# Patient Record
Sex: FEMALE | Race: WHITE | NOT HISPANIC OR LATINO | Employment: FULL TIME | URBAN - METROPOLITAN AREA
[De-identification: names, ages, dates, MRNs, and addresses within clinical notes are randomized per-mention and may not be internally consistent; named-entity substitution may affect disease eponyms.]

---

## 2017-02-18 ENCOUNTER — HOSPITAL ENCOUNTER (OUTPATIENT)
Facility: MEDICAL CENTER | Age: 22
End: 2017-02-18
Attending: PHYSICIAN ASSISTANT
Payer: COMMERCIAL

## 2017-02-18 ENCOUNTER — OFFICE VISIT (OUTPATIENT)
Dept: URGENT CARE | Facility: CLINIC | Age: 22
End: 2017-02-18
Payer: COMMERCIAL

## 2017-02-18 VITALS
TEMPERATURE: 98.4 F | OXYGEN SATURATION: 96 % | HEART RATE: 102 BPM | RESPIRATION RATE: 16 BRPM | DIASTOLIC BLOOD PRESSURE: 70 MMHG | WEIGHT: 124 LBS | SYSTOLIC BLOOD PRESSURE: 110 MMHG

## 2017-02-18 DIAGNOSIS — N39.0 URINARY TRACT INFECTION, SITE UNSPECIFIED: ICD-10-CM

## 2017-02-18 LAB
APPEARANCE UR: NORMAL
BILIRUB UR STRIP-MCNC: NORMAL MG/DL
COLOR UR AUTO: NORMAL
GLUCOSE UR STRIP.AUTO-MCNC: NORMAL MG/DL
KETONES UR STRIP.AUTO-MCNC: 160 MG/DL
LEUKOCYTE ESTERASE UR QL STRIP.AUTO: NORMAL
NITRITE UR QL STRIP.AUTO: NORMAL
PH UR STRIP.AUTO: 5 [PH] (ref 5–8)
PROT UR QL STRIP: 100 MG/DL
RBC UR QL AUTO: NORMAL
SP GR UR STRIP.AUTO: 1.02
UROBILINOGEN UR STRIP-MCNC: NORMAL MG/DL

## 2017-02-18 PROCEDURE — 99202 OFFICE O/P NEW SF 15 MIN: CPT | Performed by: PHYSICIAN ASSISTANT

## 2017-02-18 PROCEDURE — 87186 SC STD MICRODIL/AGAR DIL: CPT

## 2017-02-18 PROCEDURE — 99000 SPECIMEN HANDLING OFFICE-LAB: CPT | Performed by: PHYSICIAN ASSISTANT

## 2017-02-18 PROCEDURE — 87086 URINE CULTURE/COLONY COUNT: CPT

## 2017-02-18 PROCEDURE — 87077 CULTURE AEROBIC IDENTIFY: CPT

## 2017-02-18 PROCEDURE — 81002 URINALYSIS NONAUTO W/O SCOPE: CPT | Performed by: PHYSICIAN ASSISTANT

## 2017-02-18 RX ORDER — CEFUROXIME AXETIL 500 MG/1
500 TABLET ORAL 2 TIMES DAILY
Qty: 10 TAB | Refills: 0 | Status: SHIPPED | OUTPATIENT
Start: 2017-02-18 | End: 2017-02-23

## 2017-02-18 RX ORDER — NORGESTIMATE AND ETHINYL ESTRADIOL 0.25-0.035
1 KIT ORAL DAILY
COMMUNITY

## 2017-02-18 ASSESSMENT — ENCOUNTER SYMPTOMS
FEVER: 0
MUSCULOSKELETAL NEGATIVE: 1
FLANK PAIN: 0
ABDOMINAL PAIN: 1
CONSTITUTIONAL NEGATIVE: 1

## 2017-02-18 NOTE — MR AVS SNAPSHOT
Summer Garcia   2017 12:00 PM   Office Visit   MRN: [id number]    Department:  [facility]   Dept Phone:  [phone]    Description:  Female : 1995   Provider:  Carlos Alberto Benavidez PA-C           Reason for Visit     Urinary Frequency x 3 days, urinary frequency, burning w/ urination and lower back pain      Allergies as of 2017     No Known Allergies      You were diagnosed with     Urinary tract infection, site unspecified   [[id number]]         Vital Signs     Blood Pressure Pulse Temperature Respirations Weight Oxygen Saturation    110/70 mmHg 102 36.9 °C (98.4 °F) 16 56.246 kg (124 lb) 96%      Basic Information     Date Of Birth Sex Race Ethnicity Preferred Language    1995 Female White Non- English      Health Maintenance     Patient has no pending health maintenance at this time      Results     POCT Urinalysis      Component Value Standard Range & Units    POC Color callie Negative    POC Appearance cloudy Negative    POC Leukocyte Esterase 2+ Negative    POC Nitrites pos Negative    POC Urobiligen neg Negative (0.2) mg/dL    POC Protein 100 Negative mg/dL    POC Urine PH 5.0 5.0 - 8.0    POC Blood 3+ Negative    POC Specific Gravity 1.020 <1.005 - >1.030    POC Ketones 160 Negative mg/dL    POC Biliruben neg Negative mg/dL    POC Glucose neg Negative mg/dL                        Current Immunizations     No immunizations on file.      Below and/or attached are the medications your provider expects you to take. Review all of your home medications and newly ordered medications with your provider and/or pharmacist. Follow medication instructions as directed by your provider and/or pharmacist. Please keep your medication list with you and share with your provider. Update the information when medications are discontinued, doses are changed, or new medications (including over-the-counter products) are added; and carry medication information at all times in the event  of emergency situations     Allergies:  No Known Allergies          Medications  Valid as of: February 18, 2017 - 12:53 PM    Generic Name Brand Name Tablet Size Instructions for use    Cefuroxime Axetil (Tab) CEFTIN 500 MG Take 1 Tab by mouth 2 times a day for 5 days.        Norgestimate-Eth Estradiol (Tab) ORTHO-CYCLEN 0.25-35 MG-MCG Take 1 Tab by mouth every day.        .                 Medicines prescribed today were sent to:     [facility], [place] - [address] [facility]    [address] [place] [address]    Phone: [phone] Fax: [phone]    Open 24 Hours?: No      Medication refill instructions:       If your prescription bottle indicates you have medication refills left, it is not necessary to call your provider’s office. Please contact your pharmacy and they will refill your medication.    If your prescription bottle indicates you do not have any refills left, you may request refills at any time through one of the following ways: The online CinnaBid system (except Urgent Care), by calling your provider’s office, or by asking your pharmacy to contact your provider’s office with a refill request. Medication refills are processed only during regular business hours and may not be available until the next business day. Your provider may request additional information or to have a follow-up visit with you prior to refilling your medication.   *Please Note: Medication refills are assigned a new Rx number when refilled electronically. Your pharmacy may indicate that no refills were authorized even though a new prescription for the same medication is available at the pharmacy. Please request the medicine by name with the pharmacy before contacting your provider for a refill.        Your To Do List     Future Labs/Procedures Complete By Expires    URINE CULTURE(NEW)  As directed 8/21/2017         CinnaBid Access Code: [email or web address]  Expires: 3/20/2017  12:53 PM    Your email address is not on file at [facility].  Email Addresses are required for you to sign up for dinCloud, please contact [phone] to verify your personal information and to provide your email address prior to attempting to register for dinCloud.    Summer Garcia  [address] [address]    dinCloud  A secure, online tool to manage your health information     [facility]’s dinCloud® is a secure, online tool that connects you to your personalized health information from the privacy of your home -- day or night - making it very easy for you to manage your healthcare. Once the activation process is completed, you can even access your medical information using the dinCloud lisy, which is available for free in the Apple Lisy store or Google Play store.     To learn more about dinCloud, visit [email or web address]    There are two levels of access available (as shown below):   My Chart Features  [facility] Primary Care Doctor [facility]  Specialists [facility]  Urgent  Care Non-[facility] Primary Care Doctor   Email your healthcare team securely and privately 24/7 X X X    Manage appointments: schedule your next appointment; view details of past/upcoming appointments X      Request prescription refills. X      View recent personal medical records, including lab and immunizations X X X X   View health record, including health history, allergies, medications X X X X   Read reports about your outpatient visits, procedures, consult and ER notes X X X X   See your discharge summary, which is a recap of your hospital and/or ER visit that includes your diagnosis, lab results, and care plan X X  X     How to register for Kidblogt:  Once your e-mail address has been verified, follow the following steps to sign up for Kidblogt.     1. Go to  [email or web address]  2. Click on the Sign Up Now box, which takes you to the New Member Sign Up page. You will need to provide the following  information:  a. Enter your Satmex Access Code exactly as it appears at the top of this page. (You will not need to use this code after you’ve completed the sign-up process. If you do not sign up before the expiration date, you must request a new code.)   b. Enter your date of birth.   c. Enter your home email address.   d. Click Submit, and follow the next screen’s instructions.  3. Create a Satmex ID. This will be your Satmex login ID and cannot be changed, so think of one that is secure and easy to remember.  4. Create a Satmex password. You can change your password at any time.  5. Enter your Password Reset Question and Answer. This can be used at a later time if you forget your password.   6. Enter your e-mail address. This allows you to receive e-mail notifications when new information is available in Satmex.  7. Click Sign Up. You can now view your health information.    For assistance activating your Satmex account, call [phone]

## 2017-02-18 NOTE — PROGRESS NOTES
Subjective:      Summer Garcia is a 21 y.o. female who presents with Urinary Frequency            Urinary Frequency  This is a new problem. The current episode started in the past 7 days. The problem occurs constantly. The problem has been unchanged. Associated symptoms include abdominal pain and urinary symptoms. Pertinent negatives include no fever. Nothing aggravates the symptoms. She has tried nothing for the symptoms. The treatment provided no relief.       Review of Systems   Constitutional: Negative.  Negative for fever.   Gastrointestinal: Positive for abdominal pain.   Genitourinary: Positive for dysuria, urgency and frequency. Negative for hematuria and flank pain.   Musculoskeletal: Negative.    Skin: Negative.           Objective:     /70 mmHg  Pulse 102  Temp(Src) 36.9 °C (98.4 °F)  Resp 16  Wt 56.246 kg (124 lb)  SpO2 96%     Physical Exam   Constitutional: She is oriented to person, place, and time. She appears well-developed and well-nourished. No distress.   Abdominal: She exhibits no distension. There is no tenderness (no cvat).   Neurological: She is alert and oriented to person, place, and time.   Skin: Skin is warm and dry.   Psychiatric: She has a normal mood and affect. Her behavior is normal. Judgment and thought content normal.   Nursing note and vitals reviewed.    Filed Vitals:    02/18/17 1234   BP: 110/70   Pulse: 102   Temp: 36.9 °C (98.4 °F)   Resp: 16   Weight: 56.246 kg (124 lb)   SpO2: 96%     Active Ambulatory Problems     Diagnosis Date Noted   • No Active Ambulatory Problems     Resolved Ambulatory Problems     Diagnosis Date Noted   • No Resolved Ambulatory Problems     No Additional Past Medical History     No current outpatient prescriptions on file prior to visit.     No current facility-administered medications on file prior to visit.     Gargles, Cepacol lozenges, Aleve/Advil as needed for throat pain  History reviewed. No pertinent family  history.  Review of patient's allergies indicates no known allergies.         ua+     Assessment/Plan:     ·  uti      · rx abx; cx

## 2017-02-20 DIAGNOSIS — N39.0 URINARY TRACT INFECTION, SITE UNSPECIFIED: ICD-10-CM

## 2017-02-22 LAB
BACTERIA UR CULT: ABNORMAL
SIGNIFICANT IND 70042: ABNORMAL
SITE SITE: ABNORMAL
SOURCE SOURCE: ABNORMAL